# Patient Record
Sex: MALE | Race: WHITE | NOT HISPANIC OR LATINO | Employment: FULL TIME | URBAN - METROPOLITAN AREA
[De-identification: names, ages, dates, MRNs, and addresses within clinical notes are randomized per-mention and may not be internally consistent; named-entity substitution may affect disease eponyms.]

---

## 2022-03-25 ENCOUNTER — MEDICAL CORRESPONDENCE (OUTPATIENT)
Dept: HEALTH INFORMATION MANAGEMENT | Facility: CLINIC | Age: 27
End: 2022-03-25

## 2022-03-30 ENCOUNTER — TRANSCRIBE ORDERS (OUTPATIENT)
Dept: OTHER | Age: 27
End: 2022-03-30

## 2022-03-30 DIAGNOSIS — R11.10 REGURGITATION OF FOOD: ICD-10-CM

## 2022-03-30 DIAGNOSIS — R13.10 ABNORMAL SWALLOWING: Primary | ICD-10-CM

## 2022-04-05 ENCOUNTER — MEDICAL CORRESPONDENCE (OUTPATIENT)
Dept: HEALTH INFORMATION MANAGEMENT | Facility: CLINIC | Age: 27
End: 2022-04-05

## 2022-08-24 ENCOUNTER — TELEPHONE (OUTPATIENT)
Dept: GASTROENTEROLOGY | Facility: CLINIC | Age: 27
End: 2022-08-24

## 2022-08-24 ENCOUNTER — VIRTUAL VISIT (OUTPATIENT)
Dept: GASTROENTEROLOGY | Facility: CLINIC | Age: 27
End: 2022-08-24
Attending: PHYSICIAN ASSISTANT
Payer: COMMERCIAL

## 2022-08-24 VITALS — WEIGHT: 174 LBS

## 2022-08-24 DIAGNOSIS — R11.10 REGURGITATION OF FOOD: Primary | ICD-10-CM

## 2022-08-24 DIAGNOSIS — R11.10 REGURGITATION OF FOOD: ICD-10-CM

## 2022-08-24 DIAGNOSIS — R13.10 ABNORMAL SWALLOWING: ICD-10-CM

## 2022-08-24 PROCEDURE — 99204 OFFICE O/P NEW MOD 45 MIN: CPT | Mod: 95 | Performed by: INTERNAL MEDICINE

## 2022-08-24 ASSESSMENT — PAIN SCALES - GENERAL: PAINLEVEL: NO PAIN (0)

## 2022-08-24 NOTE — PROGRESS NOTES
HPI:    Nile presents today for a video visit to discuss dysphagia/regurgitation which has been going on since September of last year.  Was referred here by his gastroenterologist at [facility].  Regurgitates/vomits on average 5 times a day.  Initially started with certain foods but now seems to happen with everything he eats.  Depending on what he eats things may come back up within a few minutes to a few hours.  Doesn't get nauseated before bringing things up.  Sometimes will have a lot of coughing and get short of breath with these episodes.  Most of the time he brings up only food - sometimes he will notice bile (especially if it is happening at night time). Has not lost any weight with these symptoms.  Has tried a variety of reflux medications with no response    Had an EGD which was normal.  GES was also completed and normal.  Had a manometry which showed all failed swallows with no esophageal peristalsis and low IRP although concern the catheter may have been coiled.  Had a follow-up esophagram that showed reflux and mild dysmotility but otherwise normal. Did have an attempt at pH monitoring but unfortunately vomited up the catheter within about 2 hours      No past medical history on file.    No past surgical history on file.    No family history on file.    Social History     Tobacco Use     Smoking status: Former Smoker     Start date: 8/24/2017     Smokeless tobacco: Never Used   Substance Use Topics     Alcohol use: Not on file        O:    Gen: no acute distress  HEENT: NCAT  Neck: normal ROM  Resp: nonlabored breathing  Neuro: no gross deficits  Psych: appropriate mood and affect    EGD 9/2021:  Impression:            - Normal hypopharynx.                          - Normal esophagus. Biopsied.                          - Z-line regular, 37 cm from the incisors.                          - Gastritis. Biopsied    GES 9/21: normal    Manometry 3/2022:    [facility]  Outside Information             GI PROCEDURE      Procedure Note    Damien Leung MD - 2022  2:23 PM CDT   Formatting of this note is different from the original.     Patient Name: JORDAN HYMAN   Date of Service: 2022  : 1995  Age: 26  Sex: M     MRN: [id number]         Patient Loc/Room: /   Provider: Damien Leung MD, Gastroenterology     GI PROCEDURE     SITE:  [facility]   ORDERED BY:         PROCEDURE: Interpretation of high resolution esophageal motility study.     INDICATION: Chronic history of regurgitation as well as nausea and vomiting. The patient had been seen in clinic by Dr. Ken and set up for upper endoscopy, which he had with Dr. Dan. That exam was basically unremarkable. He has not had contrast studies.     The high resolution esophageal motility catheter was inserted via the nares by the nursing staff and the procedure was performed with liquid swallows. The lower esophageal sphincter was located from 46-50 cm from the nares. Resting LES pressure is very low at 4.1 and integrated residual pressures at the LES also were low at 3.4 mmHg. Motility was then assessed throughout the esophageal body. There is basically no contractile activity seen. All swallows failed. No peristalsis is identified. Distal contractile integral is low at 304.0 cm/sec.     IMPRESSION: Markedly abnormal esophageal motility study. All swallows failed, and there was no evidence of normal peristalsis. Integrated residual pressures are low, but this may point to the catheter coiling in the distal esophagus and not a true representation of the LES function. Based on the complete absence of peristaltic activity, I would be very concerned about the potential for achalasia. I would recommend obtaining a timed barium swallow to better evaluate for this process. If this is abnormal, consistent with achalasia, he would need referral to a center that does per oral myotomy  such as the [facility].     Postprocedure, the nursing staff placed a 24-hour pH monitoring catheter and that report will be dictated upon return of the recording box.         Damien Leung MD     Gastroenterology [phone]   [address]   [address] [place] [id number]       CC: Chery Navarrete PA-C, [address], [address] [place] [address]  [phone]    Romel Ken MD, [address], [address]  [id number]    Fernando Dan MD, [address], [address]  [address]       D: 03/24/2022 15:27/TGM   TID #:[id number]     T: 03/24/2022 15:44/drc    Specimen Collected: 03/24/22  3:27 PM Last Resulted: 03/18/22  2:23 PM   Received From: [facility]  Result Received: 03/30/22 12:27 PM    View Encounter     Esophagram:  FINDINGS: The patient swallowed barium mixtures as directed. The primary wave was normal. The secondary wave was mildly compromised. No significant tertiary contraction.     Gastroesophageal reflux was noted to the upper esophagus.     No focal laryngeal abnormality. No focal esophageal lesion or abnormal extrinsic compression. The gastroesophageal junction was unremarkable in appearance.     IMPRESSION: Mildly compromised esophageal motility. Gastroesophageal reflux to the upper esophagus.         Assessment and Plan:    # dysphagia, regurgitation -  Concern for motility disorder given lack of peristalsis on manometry although low IRP would argue against achalasia (however would not be accurate if catheter was misplaced).  Follow-up esophagram with mild dysmotility and reflux.  Will need further evaluation to assess for possible achalasia.  Will discuss with esophageal specialist to see if endoflip would be reasonable to pursue or if a repeat attempt at manometry should be  completed first.  Further recommendations pending results of testing    RTC after testing    Harleen Taylor, DO     Video-Visit Details     Type of service:  Video Visit        Distant Location (provider location):  [facility]      Mode of Communication:  Video Conference via [employer]

## 2022-08-24 NOTE — PATIENT INSTRUCTIONS
I will discuss your case with my colleagues to see if we should try to repeat your esophageal manometry or do a test called an endoflip - I'll let you know what we decide to pursue.  We will discuss next steps based on those results

## 2022-08-24 NOTE — TELEPHONE ENCOUNTER
Received the following message from Dr. Taylor. MAHIN spoke with patient and informed him of order being placed and that procedure scheduling will contact him. Patient verbalized understanding and had no questions at this time.     MAHIN Gilmore Kristin, DO [facility]  Hey -    I had a visit with him today - I spoke with Leandro Carlson the esophageal specialist - he would like him to have a repeat manometry in [place] at the  (not in [place]) - I put in the order and someone will be reaching out to help schedule but can you make patient aware?   Thanks   Harleen

## 2022-08-24 NOTE — PROGRESS NOTES
Nile is a 26 year old who is being evaluated via a billable video visit.      How would you like to obtain your AVS? Mail a copy  If the video visit is dropped, the invitation should be resent by: Send to e-mail at: [email or web address]  Will anyone else be joining your video visit? No

## 2022-08-25 ENCOUNTER — TELEPHONE (OUTPATIENT)
Dept: GASTROENTEROLOGY | Facility: CLINIC | Age: 27
End: 2022-08-25

## 2022-08-25 NOTE — TELEPHONE ENCOUNTER
Non- Invasive Endoscopy  Scheduling Details      Caller : Nile Polanco    Procedure scheduled: Esophageal Motility (Manometry)  Ordering Provider: Juanita  Provider/Surgeon: RN  Date of Scheduled Procedure: 09/07  Location: [facility]   [UNLESS IT IS VCE @ [facility] ARRIVAL 60MINS BEFORE]    Sedation Type: NO SEDATION  Informed patient they will need an adult  No  NEEDED  Cannot take any type of public or medical transportation alone    Where is COVID appointment Scheduled at?  HOME test      Additional comments/Staff message: Patient was sent [employer] link to sign up - Info being emailed.

## 2022-09-02 ENCOUNTER — PATIENT OUTREACH (OUTPATIENT)
Dept: GASTROENTEROLOGY | Facility: CLINIC | Age: 27
End: 2022-09-02

## 2022-09-02 NOTE — TELEPHONE ENCOUNTER
Spoke with pt and he is aware of manometry testing. Previously had manometry testing at [facility]. No questions at this time. Pt is aware need for home COVID test 1-2 days prior and nothing to eat or drink 4 hours prior to testing

## 2022-09-07 ENCOUNTER — OFFICE VISIT (OUTPATIENT)
Dept: GASTROENTEROLOGY | Facility: CLINIC | Age: 27
End: 2022-09-07
Attending: INTERNAL MEDICINE
Payer: COMMERCIAL

## 2022-09-07 VITALS
DIASTOLIC BLOOD PRESSURE: 81 MMHG | SYSTOLIC BLOOD PRESSURE: 134 MMHG | OXYGEN SATURATION: 98 % | HEIGHT: 65 IN | HEART RATE: 61 BPM | BODY MASS INDEX: 28.32 KG/M2 | WEIGHT: 170 LBS | RESPIRATION RATE: 14 BRPM

## 2022-09-07 DIAGNOSIS — R11.10 REGURGITATION OF FOOD: ICD-10-CM

## 2022-09-07 PROCEDURE — 91037 ESOPH IMPED FUNCTION TEST: CPT

## 2022-09-07 PROCEDURE — 91010 ESOPHAGUS MOTILITY STUDY: CPT

## 2022-09-07 ASSESSMENT — PAIN SCALES - GENERAL: PAINLEVEL: NO PAIN (0)

## 2022-09-07 NOTE — LETTER
"    9/7/2022         RE: Nile Polanco  [address] [facility] [address]        Dear Colleague,    Thank you for referring your patient, Nile Polanco, to the [facility] GASTRO PROCEDURE [place]. Please see a copy of my visit note below.    Non-Invasive GI Procedure Visit    Nile Polanco is a 26 year old male with history of Regurgitation of food.   Patient stated reason for procedure: Regurgitation  COVID-19 Test Performed within 48-72hrs of the procedure:  Yes. COVID-19 result was NEGATIVE.    COVID-19 PCR Results    COVID-19 PCR Results 11/20/20 8/31/21 11/15/21 2/9/22   COVID-19 Virus by PCR (External Result) Undetected Negative Negative Positive (A)   (A) Abnormal value       Comments are available for some flowsheets but are not being displayed.         COVID-19 Antibody Results, Testing for Immunity    COVID-19 Antibody Results, Testing for Immunity   No data to display.             Pre-Procedure Assessment  Patient presents to clinic today for Esophageal Manometry Study    Referring Provider: Dr. Harleen Taylor  Patient has undergone previous endoscopy.    Does patient report taking a PPI (omeprazole, pantoprazole, rabeprazole, lansoprazole, esomeprazole, dexlansoprazole)? Yes. Is patient taking a PPI twice daily? Yes  Does patient report taking a H2 blocker (ranitidine, or famotidine)? No  Does patient report taking opioids? No  Patient reported that last food and/or drink was last consumed 6 hours ago.  Esophageal Questionnaire(s) Completed: Yes - Esophageal Questionnaire(s)    BEDQ Questionnaire  No flowsheet data found.  No flowsheet data found.    Eckardt Questionnaire  No flowsheet data found.    Promis 10 Questionnaire  No flowsheet data found.    Patient Hx  Patient's history, medications and allergies were reviewed.     Height: 5' 5\"   Weight: 170 lbs 0 oz    There are no problems to display for this patient.     Prior to Admission medications    Medication Sig Start Date End Date " Taking? Authorizing Provider   omeprazole (PRILOSEC) 20 MG DR capsule Take 20 mg by mouth 2 times daily    Reported, Patient     Allergies   Allergen Reactions     Codeine Unknown     Penicillins Rash     No past medical history on file.  No past surgical history on file.  No family history on file.  Social History     Tobacco Use     Smoking status: Former Smoker     Start date: 8/24/2017     Smokeless tobacco: Never Used   Substance Use Topics     Alcohol use: Not on file        Pre-Procedure Education & Consent  Procedure education was provided to: Patient  Teaching method: Explanation  Barriers to learning: No Barrier    Patient indicated understanding of pre-procedure instruction and appropriate consent was obtained and documented.    ____________________________________________________________________    Post-Procedure Documentation: Esophageal Manometry    Manometry catheter was placed via right nare to 50 cm and normal saline swallows given per protocol. Manometry catheter was removed at the end of test.    Discharge instructions given to patient.    Notification of pending test results sent to provider for interpretation. Please reference scanned document for final interpretation of results. Patient will follow up with referring provider for test results.    Alyssa Lopez RN on 9/7/2022 at 8:43 AM

## 2022-09-07 NOTE — PATIENT INSTRUCTIONS
Esophogeal Manometry Study  1. Resume regular diet.  2. You may have a bloody nose or sore throat after the procedure.  3. If you have questions call [phone] from 7:00am-5:00pm.  For afterhours questions call GI doctor on call at [phone].

## 2022-09-07 NOTE — PROGRESS NOTES
"Non-Invasive GI Procedure Visit    Nile Polanco is a 26 year old male with history of Regurgitation of food.   Patient stated reason for procedure: Regurgitation  COVID-19 Test Performed within 48-72hrs of the procedure:  Yes. COVID-19 result was NEGATIVE.    COVID-19 PCR Results    COVID-19 PCR Results 11/20/20 8/31/21 11/15/21 2/9/22   COVID-19 Virus by PCR (External Result) Undetected Negative Negative Positive (A)   (A) Abnormal value       Comments are available for some flowsheets but are not being displayed.         COVID-19 Antibody Results, Testing for Immunity    COVID-19 Antibody Results, Testing for Immunity   No data to display.             Pre-Procedure Assessment  Patient presents to clinic today for Esophageal Manometry Study    Referring Provider: Dr. Harleen Taylor  Patient has undergone previous endoscopy.    Does patient report taking a PPI (omeprazole, pantoprazole, rabeprazole, lansoprazole, esomeprazole, dexlansoprazole)? Yes. Is patient taking a PPI twice daily? Yes  Does patient report taking a H2 blocker (ranitidine, or famotidine)? No  Does patient report taking opioids? No  Patient reported that last food and/or drink was last consumed 6 hours ago.  Esophageal Questionnaire(s) Completed: Yes - Esophageal Questionnaire(s)    BEDQ Questionnaire  No flowsheet data found.  No flowsheet data found.    Eckardt Questionnaire  No flowsheet data found.    Promis 10 Questionnaire  No flowsheet data found.    Patient Hx  Patient's history, medications and allergies were reviewed.     Height: 5' 5\"   Weight: 170 lbs 0 oz    There are no problems to display for this patient.     Prior to Admission medications    Medication Sig Start Date End Date Taking? Authorizing Provider   omeprazole (PRILOSEC) 20 MG DR capsule Take 20 mg by mouth 2 times daily    Reported, Patient     Allergies   Allergen Reactions     Codeine Unknown     Penicillins Rash     No past medical history on file.  No past surgical " history on file.  No family history on file.  Social History     Tobacco Use     Smoking status: Former Smoker     Start date: 8/24/2017     Smokeless tobacco: Never Used   Substance Use Topics     Alcohol use: Not on file        Pre-Procedure Education & Consent  Procedure education was provided to: Patient  Teaching method: Explanation  Barriers to learning: No Barrier    Patient indicated understanding of pre-procedure instruction and appropriate consent was obtained and documented.    ____________________________________________________________________    Post-Procedure Documentation: Esophageal Manometry    Manometry catheter was placed via right nare to 50 cm and normal saline swallows given per protocol. Manometry catheter was removed at the end of test.    Discharge instructions given to patient.    Notification of pending test results sent to provider for interpretation. Please reference scanned document for final interpretation of results. Patient will follow up with referring provider for test results.    Alyssa Lopez RN on 9/7/2022 at 8:43 AM

## 2022-09-15 ENCOUNTER — MYC MEDICAL ADVICE (OUTPATIENT)
Dept: GASTROENTEROLOGY | Facility: CLINIC | Age: 27
End: 2022-09-15

## 2022-09-15 NOTE — TELEPHONE ENCOUNTER
Replied to [employer] message that results/report are not viewable to writer. Informed patient that providers will typically sned patients that have MyChart a summary of test results and recommendations.     Harleen Balderas RN

## 2022-09-18 NOTE — TELEPHONE ENCOUNTER
[employer] message sent to patient that message will be forwarded to provider for review.    Harleen Balderas RN

## 2022-09-19 ENCOUNTER — PATIENT OUTREACH (OUTPATIENT)
Dept: GASTROENTEROLOGY | Facility: CLINIC | Age: 27
End: 2022-09-19

## 2022-09-19 DIAGNOSIS — R11.10 REGURGITATION OF FOOD: Primary | ICD-10-CM

## 2022-09-19 NOTE — PROGRESS NOTES
Returned call to pt regarding questions about recent esophageal manometry testing. Pt is aware of mychart results and will follow up with referring provider.

## 2022-09-20 ENCOUNTER — TELEPHONE (OUTPATIENT)
Dept: GASTROENTEROLOGY | Facility: CLINIC | Age: 27
End: 2022-09-20

## 2022-09-20 NOTE — TELEPHONE ENCOUNTER
Harleen Taylor, DO  You Yesterday (6:57 AM)       I spoke with Leandro - he recommends an EGD with Bravo off PPI - I can put in the order - he can either have it here with [facility] or he can go to the [facility], whatever he prefers.      Replied to [employer] message with provider response above and that new inquiry will be forwarded to provider.      Harleen Balderas RN

## 2022-09-20 NOTE — TELEPHONE ENCOUNTER
Screening Questions  BLUE  KIND OF PREP RED  LOCATION [review exclusion criteria] GREEN  SEDATION TYPE        Y Are you active on mychart?       [employer] Ordering/Referring Provider?        [employer] What type of coverage do you have?      N Have you had a positive covid test in the last 90 days?     1. 28.3 BMI  [BMI 40+ - review exclusion criteria]    2. Y  Are you able to give consent for your medical care? [IF NO,RN REVIEW]        3. N  Are you taking any prescription pain medications on a routine schedule?        3a.  EXTENDED PREP What kind of prescription?   4. N Do you have any chemical dependencies such as alcohol, street drugs, or methadone?    5. N Do you have any history of post-traumatic stress syndrome, severe anxiety or history of psychosis?      **If yes 3- 5 , please schedule with MAC sedation.**    BMI OVER 40 NEED PAC EVALUATION FOR [facility]          IF YES TO ANY 6 - 10 - HOSPITAL SETTING ONLY.     6.   N Do you need assistance transferring?     7.   N Have you had a heart or lung transplant?    8.   N Are you currently on dialysis?   9.   N Do you use daily home oxygen?   10. N Do you take nitroglycerin?   10a.  If yes, how often?     11. [FEMALES]   Are you currently pregnant?    11a.  If yes, how many weeks? [ Greater than 12 weeks, OR NEEDED]    12. N Do you have Pulmonary Hypertension? *NEED PAC APPT AT [facility]*     13. N [review exclusion criteria]  Do you have any implantable devices in your body (pacemaker, defib, LVAD)?    14. N In the past 6 months, have you had any heart related issues including cardiomyopathy or heart attack?     14a.  If yes, did it require cardiac stenting if so when?     15. N Have you had a stroke or Transient ischemic attack (TIA - aka  mini stroke ) within 6 months?      16.  Do you have mod to severe Obstructive Sleep Apnea?  [Hospital only - Ok at [facility]]    17. N Do you have SEVERE AND UNCONTROLLED asthma? *NEED PAC APPT AT [facility]*     18. N Are you currently  "taking any blood thinners?     18a. If yes, inform patient to \"follow up w/ ordering provider for bridging instructions.\"    19. N Do you take the medication Phentermine?    19a. If yes, \"Hold for 7 days before procedure.  Please consult your prescribing provider if you have questions about holding this medication.\"     20. N  Do you have chronic kidney disease?      21. N  Do you have a diagnosis of diabetes?     22.   On a regular basis do you go 3-5 days between bowel movements?     23. N Preferred LOCAL Pharmacy for Pre Prescription    [ LIST ONLY ONE PHARMACY]     [id number] - [facility] [place] [place] - [address] AT [place] [address] & [address]        - CLOSING REMINDERS -    Informed patient they will need an adult    Cannot take any type of public or medical transportation alone    Conscious Sedation- Needs  for 6 hours after the procedure       MAC/General-Needs  for 24 hours after procedure    Pre-Procedure Covid test to be completed [[facility] PCR Testing Required]    Confirmed Nurse will call to complete assessment       - SCHEDULING DETAILS -     [facility]  Surgeon    11/2/22  Date of Procedure  Upper Endoscopy with BRAVO [EGD BRAVO]  Type of Procedure Scheduled   MG Location  Which Colonoscopy Prep was Sent?     CS Sedation Type     N PAC / Pre-op Required         Additional comments:            "

## 2022-09-23 NOTE — TELEPHONE ENCOUNTER
MyChart message sent stating that writer does not have access to the procedure schedules and he could try calling procedure scheduling to see if there is another location that had sooner availability. Scheduling number provided.     Harleen Balderas RN

## 2022-09-23 NOTE — TELEPHONE ENCOUNTER
[employer] reply reiterating that further recommendations can be made after the results of the EGD with Orozco study.     Harleen Balderas RN

## 2022-10-03 ENCOUNTER — HEALTH MAINTENANCE LETTER (OUTPATIENT)
Age: 27
End: 2022-10-03

## 2022-10-04 ENCOUNTER — TELEPHONE (OUTPATIENT)
Dept: GASTROENTEROLOGY | Facility: CLINIC | Age: 27
End: 2022-10-04

## 2022-10-04 NOTE — TELEPHONE ENCOUNTER
Caller: Nile    Procedure: EGD with Bravo    Date, Location, and Surgeon of Procedure Cancelled: 11/2/22 [facility]    Ordering Provider:Claudia    Reason for cancel (please be detailed, any staff messages or encounters to note?): Patient wanted sooner        Rescheduled: Yes     If rescheduled:    Date: 10/12/22   Location:    Prep Resent: No changes (changes to prep?)   Covid Test Rescheduled: No   Note any change or update to original order/sedation: None

## 2022-10-21 ENCOUNTER — ANESTHESIA (OUTPATIENT)
Dept: SURGERY | Facility: AMBULATORY SURGERY CENTER | Age: 27
End: 2022-10-21
Payer: COMMERCIAL

## 2022-10-21 ENCOUNTER — HOSPITAL ENCOUNTER (OUTPATIENT)
Facility: AMBULATORY SURGERY CENTER | Age: 27
Discharge: HOME | End: 2022-10-21
Attending: INTERNAL MEDICINE
Payer: COMMERCIAL

## 2022-10-21 ENCOUNTER — ANESTHESIA EVENT (OUTPATIENT)
Dept: SURGERY | Facility: AMBULATORY SURGERY CENTER | Age: 27
End: 2022-10-21
Payer: COMMERCIAL

## 2022-10-21 ENCOUNTER — TELEPHONE (OUTPATIENT)
Dept: MULTI SPECIALTY CLINIC | Facility: CLINIC | Age: 27
End: 2022-10-21

## 2022-10-21 VITALS
SYSTOLIC BLOOD PRESSURE: 119 MMHG | OXYGEN SATURATION: 100 % | TEMPERATURE: 97.8 F | HEART RATE: 88 BPM | DIASTOLIC BLOOD PRESSURE: 75 MMHG | RESPIRATION RATE: 16 BRPM

## 2022-10-21 VITALS — HEART RATE: 85 BPM

## 2022-10-21 LAB — UPPER GI ENDOSCOPY: NORMAL

## 2022-10-21 PROCEDURE — 43235 EGD DIAGNOSTIC BRUSH WASH: CPT

## 2022-10-21 PROCEDURE — G8918 PT W/O PREOP ORDER IV AB PRO: HCPCS

## 2022-10-21 PROCEDURE — G8907 PT DOC NO EVENTS ON DISCHARG: HCPCS

## 2022-10-21 RX ORDER — NALOXONE HYDROCHLORIDE 0.4 MG/ML
0.2 INJECTION, SOLUTION INTRAMUSCULAR; INTRAVENOUS; SUBCUTANEOUS
Status: DISCONTINUED | OUTPATIENT
Start: 2022-10-21 | End: 2022-10-22 | Stop reason: HOSPADM

## 2022-10-21 RX ORDER — PROPOFOL 10 MG/ML
INJECTION, EMULSION INTRAVENOUS CONTINUOUS PRN
Status: DISCONTINUED | OUTPATIENT
Start: 2022-10-21 | End: 2022-10-21

## 2022-10-21 RX ORDER — NALOXONE HYDROCHLORIDE 0.4 MG/ML
0.4 INJECTION, SOLUTION INTRAMUSCULAR; INTRAVENOUS; SUBCUTANEOUS
Status: DISCONTINUED | OUTPATIENT
Start: 2022-10-21 | End: 2022-10-22 | Stop reason: HOSPADM

## 2022-10-21 RX ORDER — SODIUM CHLORIDE, SODIUM LACTATE, POTASSIUM CHLORIDE, CALCIUM CHLORIDE 600; 310; 30; 20 MG/100ML; MG/100ML; MG/100ML; MG/100ML
INJECTION, SOLUTION INTRAVENOUS CONTINUOUS PRN
Status: COMPLETED | OUTPATIENT
Start: 2022-10-21 | End: 2022-10-21

## 2022-10-21 RX ORDER — ONDANSETRON 2 MG/ML
4 INJECTION INTRAMUSCULAR; INTRAVENOUS
Status: DISCONTINUED | OUTPATIENT
Start: 2022-10-21 | End: 2022-10-22 | Stop reason: HOSPADM

## 2022-10-21 RX ORDER — PROCHLORPERAZINE MALEATE 10 MG
10 TABLET ORAL EVERY 6 HOURS PRN
Status: DISCONTINUED | OUTPATIENT
Start: 2022-10-21 | End: 2022-10-22 | Stop reason: HOSPADM

## 2022-10-21 RX ORDER — PROPOFOL 10 MG/ML
INJECTION, EMULSION INTRAVENOUS PRN
Status: DISCONTINUED | OUTPATIENT
Start: 2022-10-21 | End: 2022-10-21

## 2022-10-21 RX ORDER — FLUMAZENIL 0.1 MG/ML
0.2 INJECTION, SOLUTION INTRAVENOUS
Status: DISCONTINUED | OUTPATIENT
Start: 2022-10-21 | End: 2022-10-22 | Stop reason: HOSPADM

## 2022-10-21 RX ORDER — LIDOCAINE 40 MG/G
CREAM TOPICAL
Status: DISCONTINUED | OUTPATIENT
Start: 2022-10-21 | End: 2022-10-22 | Stop reason: HOSPADM

## 2022-10-21 RX ORDER — LIDOCAINE HYDROCHLORIDE 20 MG/ML
INJECTION, SOLUTION INFILTRATION; PERINEURAL PRN
Status: DISCONTINUED | OUTPATIENT
Start: 2022-10-21 | End: 2022-10-21

## 2022-10-21 RX ORDER — ONDANSETRON 2 MG/ML
4 INJECTION INTRAMUSCULAR; INTRAVENOUS EVERY 6 HOURS PRN
Status: DISCONTINUED | OUTPATIENT
Start: 2022-10-21 | End: 2022-10-22 | Stop reason: HOSPADM

## 2022-10-21 RX ORDER — ONDANSETRON 4 MG/1
4 TABLET, ORALLY DISINTEGRATING ORAL EVERY 6 HOURS PRN
Status: DISCONTINUED | OUTPATIENT
Start: 2022-10-21 | End: 2022-10-22 | Stop reason: HOSPADM

## 2022-10-21 RX ADMIN — PROPOFOL 100 MG: 10 INJECTION, EMULSION INTRAVENOUS at 12:53

## 2022-10-21 RX ADMIN — LIDOCAINE HYDROCHLORIDE 100 MG: 20 INJECTION, SOLUTION INFILTRATION; PERINEURAL at 12:53

## 2022-10-21 RX ADMIN — PROPOFOL 200 MCG/KG/MIN: 10 INJECTION, EMULSION INTRAVENOUS at 12:53

## 2022-10-21 NOTE — ANESTHESIA PREPROCEDURE EVALUATION
Anesthesia Pre-Procedure Evaluation    Patient: Nile Polanco   MRN: [id number] : 1995        Procedure : Procedure(s):  ESOPHAGOGASTRODUODENOSCOPY, WITH BRAVO PH MONITORING DEVICE INSERTION          History reviewed. No pertinent past medical history.   History reviewed. No pertinent surgical history.   Allergies   Allergen Reactions     Codeine Unknown     Penicillins Rash      Social History     Tobacco Use     Smoking status: Former     Types: Cigarettes     Start date: 2017     Smokeless tobacco: Never   Substance Use Topics     Alcohol use: Not Currently      Wt Readings from Last 1 Encounters:   22 77.1 kg (170 lb)        Anesthesia Evaluation   Pt has had prior anesthetic. Type: MAC.        ROS/MED HX  ENT/Pulmonary:  - neg pulmonary ROS     Neurologic:  - neg neurologic ROS     Cardiovascular:  - neg cardiovascular ROS     METS/Exercise Tolerance: >4 METS    Hematologic:  - neg hematologic  ROS     Musculoskeletal:  - neg musculoskeletal ROS     GI/Hepatic:     (+) GERD, Symptomatic, esophageal disease,     Renal/Genitourinary:  - neg Renal ROS     Endo:  - neg endo ROS     Psychiatric/Substance Use:  - neg psychiatric ROS     Infectious Disease:  - neg infectious disease ROS     Malignancy:  - neg malignancy ROS     Other:            Physical Exam    Airway  airway exam normal           Respiratory Devices and Support         Dental  no notable dental history         Cardiovascular   cardiovascular exam normal          Pulmonary   pulmonary exam normal                OUTSIDE LABS:  CBC: No results found for: WBC, HGB, HCT, PLT  BMP: No results found for: NA, POTASSIUM, CHLORIDE, CO2, BUN, CR, GLC  COAGS: No results found for: PTT, INR, FIBR  POC: No results found for: BGM, HCG, HCGS  HEPATIC: No results found for: ALBUMIN, PROTTOTAL, ALT, AST, GGT, ALKPHOS, BILITOTAL, BILIDIRECT, RUPAL  OTHER: No results found for: PH, LACT, A1C, LEYDA, PHOS, MAG, LIPASE, AMYLASE, TSH, T4, T3, CRP,  SED    Anesthesia Plan    ASA Status:  2      Anesthesia Type: MAC.      Maintenance: TIVA.        Consents    Anesthesia Plan(s) and associated risks, benefits, and realistic alternatives discussed. Questions answered and patient/representative(s) expressed understanding.     - Discussed: Risks, Benefits and Alternatives for BOTH SEDATION and the PROCEDURE were discussed     - Discussed with:  Patient      - Extended Intubation/Ventilatory Support Discussed: No.      - Patient is DNR/DNI Status: No    Use of blood products discussed: No .     Postoperative Care            Comments:           H&P reviewed: Unable to attach H&P to encounter due to EHR limitations. H&P Update: appropriate H&P reviewed, patient examined. No interval changes since H&P (within 30 days).         Humphrey Guerra MD

## 2022-10-21 NOTE — ANESTHESIA CARE TRANSFER NOTE
Patient: Nile Polanco    Procedure: Procedure(s):  ESOPHAGOGASTRODUODENOSCOPY, WITH BRAVO PH MONITORING DEVICE INSERTION       Diagnosis: Regurgitation of food [R11.10]  Diagnosis Additional Information: No value filed.    Anesthesia Type:   MAC     Note:    Oropharynx: oropharynx clear of all foreign objects  Level of Consciousness: awake  Oxygen Supplementation: room air    Independent Airway: airway patency satisfactory and stable  Dentition: dentition unchanged  Vital Signs Stable: post-procedure vital signs reviewed and stable  Report to RN Given: handoff report given  Patient transferred to: Phase II    Handoff Report: Identifed the Patient, Identified the Reponsible Provider, Reviewed the pertinent medical history, Discussed the surgical course, Reviewed Intra-OP anesthesia mangement and issues during anesthesia, Set expectations for post-procedure period and Allowed opportunity for questions and acknowledgement of understanding      Vitals:  Vitals Value Taken Time   BP     Temp     Pulse     Resp     SpO2         Electronically Signed By: YOANA Houston CRNA  October 21, 2022  1:14 PM

## 2022-10-21 NOTE — ANESTHESIA POSTPROCEDURE EVALUATION
Patient: Nile Polanco    Procedure: Procedure(s):  ESOPHAGOGASTRODUODENOSCOPY, WITH BRAVO PH MONITORING DEVICE INSERTION       Anesthesia Type:  MAC    Note:     Postop Pain Control: Uneventful            Sign Out: Well controlled pain   PONV: No   Neuro/Psych: Uneventful            Sign Out: Acceptable/Baseline neuro status   Airway/Respiratory: Uneventful            Sign Out: Acceptable/Baseline resp. status   CV/Hemodynamics: Uneventful            Sign Out: Acceptable CV status; No obvious hypovolemia; No obvious fluid overload   Other NRE: NONE   DID A NON-ROUTINE EVENT OCCUR? No           Last vitals:  Vitals Value Taken Time   /69 10/21/22 1313   Temp     Pulse 92 10/21/22 1313   Resp 16 10/21/22 1313   SpO2 100 % 10/21/22 1313       Electronically Signed By: Humphrey Guerra MD  October 21, 2022  1:38 PM

## 2022-10-22 NOTE — TELEPHONE ENCOUNTER
"On Call GI Phone Note:    10/21/22    I was contacted by the [facility] paging service tonight regarding throat pain, regurgitation, and concern for procedure complications. Nile underwent an EGD with Bravo placement earlier today.    All recent procedures and progress notes reviewed.     Nile provides that he has had 2x episodes of regurgitation/burping since his procedure today that has brought up bilious \"bitter\" acidic saliva with a blood tinge. He describes it as \"a bunch of spit with some blood in it\". These episodes have occurred since laying down for bed around 8 pm. Pyrosis and laryngeal irritation are both significant tonight as well. He feels that his reflux is terrible.    Denies nausea, vomiting, hematemesis, coffee ground emesis, dysphagia, chest pain, fevers, chills.     He wonders if he can take any anti-acid or anti-secretory medications, though remembers being told that during the Bravo testing he needs to refrain. I counseled to refrain as long as he possibly could to optimize our results, only to use PRN TUMs sparingly if he felt he couldn't manage the symptoms. If he were to use them, I also mentioned to note the time and dose on his symptom recorder. He has been dutifully recording symptoms since his procedure earlier today.     All questions answered. He understands when and how to contact the on call GI fellow and what symptoms would deem in person evaluation necessary.     On-call GI remains available.     Hardy Felix MD, PGY5  Gastroenterology Fellow  [facility]  See AMCOM/Jessie for GI on-call information        "

## 2022-10-25 ENCOUNTER — TELEPHONE (OUTPATIENT)
Dept: GASTROENTEROLOGY | Facility: CLINIC | Age: 27
End: 2022-10-25

## 2022-10-25 NOTE — TELEPHONE ENCOUNTER
Bertrand Smalls MD  [facility]  Cc: Harleen Taylor,   Hi Team,   This patient contacted our overnight provider with symptoms.  Can we ensure he is feeling improved?  I believe he should be ending his study sometime today.        Called patient to check status. Stated the first night after the study was miserable as when he goes without medications heartburn/reflux is awful. Denies other sxs (fever, etc).  Reports vomiting each day since procedure (3x, 1x, 7x, 5x) ~ a cup each time. Patient states this is not new since the procedure, has dealt with this for over a year. Notes certain foods will trigger, has been taking healthy, smaller meals (which he had been doing since pre-procedure).   Has noted blood-tinged vomit/spit - did discuss this with on-call provider (see phone encounter on 10/21)  Has been diligent with note taking regarding sxs with Orozco, including if he took medication - noted he has been taking a omeprazole at night just so he can try to get some sleep. Of note, he did state that PCP ordered magic mouth wash pre-procedure for canker sores in mouth.   Discussed with patient turnaround time for procedure results - patient aware. Instructed patient to reach out if he has additional questions/concerns. Patient appreciative of phone call.  Will route to provider(s) for review.     Harleen Balderas RN

## 2022-10-27 ENCOUNTER — MYC MEDICAL ADVICE (OUTPATIENT)
Dept: GASTROENTEROLOGY | Facility: CLINIC | Age: 27
End: 2022-10-27

## 2022-10-27 NOTE — TELEPHONE ENCOUNTER
Harleen Taylor, DO  You 28 minutes ago (9:50 AM)     Messages have already been sent to expedite this as much as possible - we will make further recommendations when the results are available as to next steps in his treatment plan      MyChart message sent with provider response above.     Harleen Balderas RN

## 2022-10-27 NOTE — TELEPHONE ENCOUNTER
[employer] message sent to patient stating message/update/inquiry will be forwarded to provider(s) for review.     Harleen Balderas RN

## 2022-10-28 ENCOUNTER — DOCUMENTATION ONLY (OUTPATIENT)
Dept: SURGERY | Facility: AMBULATORY SURGERY CENTER | Age: 27
End: 2022-10-28
Payer: COMMERCIAL

## 2022-10-28 DIAGNOSIS — K21.9 ESOPHAGEAL REFLUX: Primary | ICD-10-CM

## 2022-11-03 NOTE — TELEPHONE ENCOUNTER
[employer] message sent to patient to update that message has been sent to provider for review.    Tory Noyola RN

## 2022-11-04 NOTE — TELEPHONE ENCOUNTER
Harleen Taylor, DO [place], KALI Spears  Phone Number: [phone]     No results back yet (as I've said, I will let him know when they are available).  He can try PRN gaviscon (purchased over the counter)   Thanks     [employer] message sent to patient with the above recommendations from provider.  Tory Noyola RN

## 2022-11-04 NOTE — TELEPHONE ENCOUNTER
[employer] message sent to patient asking some follow up questions and to update that concerns have been sent to provider for review.  Tory Noyola RN

## 2022-11-08 NOTE — TELEPHONE ENCOUNTER
Harleen Taylor DO [place], KALI Spears  Phone Number: [phone]     No other recommendations. Thanks for checking on his results.  I'll let him know when they're available   Thanks!       [employer] message sent to patient updating on above recommendations.  Tory Noyola RN

## 2022-11-08 NOTE — TELEPHONE ENCOUNTER
[employer] message sent to patient to update that his message has been sent to provider for review.    Tory Noyola RN

## 2022-11-09 DIAGNOSIS — R11.10 REGURGITATION OF FOOD: Primary | ICD-10-CM

## 2022-11-09 RX ORDER — IMIPRAMINE HCL 25 MG
25 TABLET ORAL AT BEDTIME
Qty: 30 TABLET | Refills: 3 | Status: SHIPPED | OUTPATIENT
Start: 2022-11-09

## 2022-11-09 NOTE — TELEPHONE ENCOUNTER
Harleen Taylor DO [place], Angie, RN  Phone Number: [phone]     He doesn't have achalasia - that was the concern when he was referred here but his manometry didn't show it here.  All of this will be discussed at a follow-up clinic visit as I find that to be more helpful than going back and forth on [employer] messages.  Have him keep a list of all his questions to have with him during the appointment so we can make sure to discuss all of his concerns.   Thanks      [employer] message sent to patient to update on the above message from provider.    Tory Noyola RN

## 2022-11-09 NOTE — TELEPHONE ENCOUNTER
[employer] message sent to patient to update that his message was forwarded to provider.  Tory Noyola RN

## 2023-10-22 ENCOUNTER — HEALTH MAINTENANCE LETTER (OUTPATIENT)
Age: 28
End: 2023-10-22

## 2024-12-14 ENCOUNTER — HEALTH MAINTENANCE LETTER (OUTPATIENT)
Age: 29
End: 2024-12-14

## (undated) RX ORDER — LIDOCAINE HYDROCHLORIDE 20 MG/ML
SOLUTION OROPHARYNGEAL
Status: DISPENSED
Start: 2022-09-07